# Patient Record
Sex: FEMALE | URBAN - METROPOLITAN AREA
[De-identification: names, ages, dates, MRNs, and addresses within clinical notes are randomized per-mention and may not be internally consistent; named-entity substitution may affect disease eponyms.]

---

## 2017-08-06 ENCOUNTER — EMERGENCY (EMERGENCY)
Facility: HOSPITAL | Age: 27
LOS: 1 days | Discharge: HOME | End: 2017-08-06
Admitting: EMERGENCY MEDICINE
Payer: OTHER MISCELLANEOUS

## 2017-08-06 VITALS
RESPIRATION RATE: 16 BRPM | TEMPERATURE: 98 F | DIASTOLIC BLOOD PRESSURE: 86 MMHG | HEART RATE: 78 BPM | OXYGEN SATURATION: 100 % | SYSTOLIC BLOOD PRESSURE: 130 MMHG

## 2017-08-06 PROCEDURE — 99284 EMERGENCY DEPT VISIT MOD MDM: CPT | Mod: 25

## 2017-08-06 PROCEDURE — 99053 MED SERV 10PM-8AM 24 HR FAC: CPT

## 2017-08-06 NOTE — ED PROVIDER NOTE - OBJECTIVE STATEMENT
Pt is a 26 y/o F presenting to the ED s/p needle stick to the L3 digit, onset prior to arrival. 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 Denies PEP. Finger was washed copiously. Pt denies F/C, bleeding, and LUE numbness/tingling/weakness. Pt's TDAP is UTD. Pt is a 28 y/o F presenting to the ED s/p needle stick to the L3 digit, onset prior to arrival. 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 Denies PEP. Finger was washed copiously. Pt denies F/C, bleeding, and LUE numbness/tingling/weakness. Pt's TDAP is UTD. Denies chance of pregnancy. Pt is a 28 y/o F presenting to the ED s/p needle stick to the L3 digit, onset prior to arrival. 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 Finger was washed copiously. Pt denies F/C, bleeding, and LUE numbness/tingling/weakness. Pt's TDAP is UTD. Denies chance of pregnancy.

## 2017-08-06 NOTE — ED PROVIDER NOTE - PLAN OF CARE
Rest, drink plenty of fluids.  Advance activity as tolerated.  Continue all previously prescribed medications as directed.  Follow up with [facility] in 48-72 hours. Follow up with your primary care physician in 48-72 hours- bring copies of your results.  Return to the Emergency Department for fevers,  worsening or persistent symptoms OR ANY NEW OR CONCERNING SYMPTOMS.

## 2017-08-06 NOTE — ED PROVIDER NOTE - SKIN, MLM
Skin normal color for race, warm, dry and intact. No evidence of rash. No puncture wounds, no skin break, no swelling, no erythema

## 2017-08-06 NOTE — ED PROVIDER NOTE - MEDICAL DECISION MAKING DETAILS
28 yo F s/p needle stick while on the job. Will do occupation exposure protocol. Denies PEP. Pt advised to f/u with employee health tomorrow. 28 yo F s/p needle stick while on the job. Will do occupation exposure protocol. offered PEP Denies PEP, risks and benefits discussed. Pt advised to f/u with employee health tomorrow.

## 2017-08-06 NOTE — ED PROVIDER NOTE - CARE PLAN
Principal Discharge DX:	Exposure  Instructions for follow-up, activity and diet:	Rest, drink plenty of fluids.  Advance activity as tolerated.  Continue all previously prescribed medications as directed.  Follow up with Employee health in 48-72 hours. Follow up with your primary care physician in 48-72 hours- bring copies of your results.  Return to the Emergency Department for fevers,  worsening or persistent symptoms OR ANY NEW OR CONCERNING SYMPTOMS.

## 2018-03-18 ENCOUNTER — EMERGENCY (EMERGENCY)
Facility: HOSPITAL | Age: 28
LOS: 1 days | Discharge: HOME | End: 2018-03-18
Admitting: EMERGENCY MEDICINE
Payer: COMMERCIAL

## 2018-03-18 VITALS
DIASTOLIC BLOOD PRESSURE: 80 MMHG | SYSTOLIC BLOOD PRESSURE: 125 MMHG | HEART RATE: 98 BPM | TEMPERATURE: 99 F | RESPIRATION RATE: 17 BRPM | OXYGEN SATURATION: 99 %

## 2018-03-18 LAB
ALBUMIN SERPL ELPH-MCNC: 3.7 G/DL — SIGNIFICANT CHANGE UP (ref 3.4–5)
ALP SERPL-CCNC: 50 U/L — SIGNIFICANT CHANGE UP (ref 40–120)
ALT FLD-CCNC: 14 U/L — SIGNIFICANT CHANGE UP (ref 12–42)
ANION GAP SERPL CALC-SCNC: 12 MMOL/L — SIGNIFICANT CHANGE UP (ref 9–16)
APPEARANCE UR: CLEAR — SIGNIFICANT CHANGE UP
AST SERPL-CCNC: 12 U/L — LOW (ref 15–37)
BASOPHILS NFR BLD AUTO: 0.3 % — SIGNIFICANT CHANGE UP (ref 0–2)
BILIRUB SERPL-MCNC: 0.5 MG/DL — SIGNIFICANT CHANGE UP (ref 0.2–1.2)
BILIRUB UR-MCNC: (no result)
BUN SERPL-MCNC: 10 MG/DL — SIGNIFICANT CHANGE UP (ref 7–23)
CALCIUM SERPL-MCNC: 9.2 MG/DL — SIGNIFICANT CHANGE UP (ref 8.5–10.5)
CHLORIDE SERPL-SCNC: 103 MMOL/L — SIGNIFICANT CHANGE UP (ref 96–108)
CO2 SERPL-SCNC: 25 MMOL/L — SIGNIFICANT CHANGE UP (ref 22–31)
COLOR SPEC: YELLOW — SIGNIFICANT CHANGE UP
CREAT SERPL-MCNC: 0.7 MG/DL — SIGNIFICANT CHANGE UP (ref 0.5–1.3)
DIFF PNL FLD: (no result)
EOSINOPHIL NFR BLD AUTO: 0.1 % — SIGNIFICANT CHANGE UP (ref 0–6)
GLUCOSE SERPL-MCNC: 92 MG/DL — SIGNIFICANT CHANGE UP (ref 70–99)
GLUCOSE UR QL: NEGATIVE — SIGNIFICANT CHANGE UP
HCG UR QL: NEGATIVE — SIGNIFICANT CHANGE UP
HCT VFR BLD CALC: 38.8 % — SIGNIFICANT CHANGE UP (ref 34.5–45)
HGB BLD-MCNC: 12.8 G/DL — SIGNIFICANT CHANGE UP (ref 11.5–15.5)
IMM GRANULOCYTES NFR BLD AUTO: 0.3 % — SIGNIFICANT CHANGE UP (ref 0–1.5)
KETONES UR-MCNC: 40 MG/DL
LEUKOCYTE ESTERASE UR-ACNC: NEGATIVE — SIGNIFICANT CHANGE UP
LIDOCAIN IGE QN: 79 U/L — SIGNIFICANT CHANGE UP (ref 73–393)
LYMPHOCYTES # BLD AUTO: 9.2 % — LOW (ref 13–44)
MAGNESIUM SERPL-MCNC: 1.7 MG/DL — SIGNIFICANT CHANGE UP (ref 1.6–2.6)
MCHC RBC-ENTMCNC: 30 PG — SIGNIFICANT CHANGE UP (ref 27–34)
MCHC RBC-ENTMCNC: 33 G/DL — SIGNIFICANT CHANGE UP (ref 32–36)
MCV RBC AUTO: 91.1 FL — SIGNIFICANT CHANGE UP (ref 80–100)
MONOCYTES NFR BLD AUTO: 9.1 % — SIGNIFICANT CHANGE UP (ref 2–14)
NEUTROPHILS NFR BLD AUTO: 81 % — HIGH (ref 43–77)
NITRITE UR-MCNC: NEGATIVE — SIGNIFICANT CHANGE UP
PH UR: 6 — SIGNIFICANT CHANGE UP (ref 5–8)
PLATELET # BLD AUTO: 173 K/UL — SIGNIFICANT CHANGE UP (ref 150–400)
POTASSIUM SERPL-MCNC: 3.4 MMOL/L — LOW (ref 3.5–5.3)
POTASSIUM SERPL-SCNC: 3.4 MMOL/L — LOW (ref 3.5–5.3)
PROT SERPL-MCNC: 7.5 G/DL — SIGNIFICANT CHANGE UP (ref 6.4–8.2)
PROT UR-MCNC: (no result) MG/DL
RBC # BLD: 4.26 M/UL — SIGNIFICANT CHANGE UP (ref 3.8–5.2)
RBC # FLD: 13.2 % — SIGNIFICANT CHANGE UP (ref 10.3–16.9)
SODIUM SERPL-SCNC: 140 MMOL/L — SIGNIFICANT CHANGE UP (ref 132–145)
SP GR SPEC: >=1.03 — SIGNIFICANT CHANGE UP (ref 1–1.03)
UROBILINOGEN FLD QL: 0.2 E.U./DL — SIGNIFICANT CHANGE UP
WBC # BLD: 8.6 K/UL — SIGNIFICANT CHANGE UP (ref 3.8–10.5)
WBC # FLD AUTO: 8.6 K/UL — SIGNIFICANT CHANGE UP (ref 3.8–10.5)

## 2018-03-18 PROCEDURE — 99284 EMERGENCY DEPT VISIT MOD MDM: CPT

## 2018-03-18 RX ORDER — SODIUM CHLORIDE 9 MG/ML
1000 INJECTION INTRAMUSCULAR; INTRAVENOUS; SUBCUTANEOUS ONCE
Refills: 0 | Status: COMPLETED | OUTPATIENT
Start: 2018-03-18 | End: 2018-03-18

## 2018-03-18 RX ADMIN — SODIUM CHLORIDE 1000 MILLILITER(S): 9 INJECTION INTRAMUSCULAR; INTRAVENOUS; SUBCUTANEOUS at 12:55

## 2018-03-18 NOTE — ED PROVIDER NOTE - OBJECTIVE STATEMENT
PMHx appendectomy, LMP x 3 weeks on oral contraceptives presents with generalized abdominal pain 8-10/10 in intensity waxing and waning x 3 days associated with 2 loose stools daily. patient had noted that she had been constipated earlier in the week. states that she has been having episodes of alternating constipation and loose stools for the past month. denies fever, chills, nightsweats, recent travel, dysuria, anorexia. patient states that she feels hungry but is fearful that eating may worsen her symptoms. denies any pain at this time. patient was seen at an urgent care and was told to go to the ED for further work up.

## 2018-03-18 NOTE — ED PROVIDER NOTE - MEDICAL DECISION MAKING DETAILS
Patient with episodic abdominal pain with loose stools x 3 day. asymptomatic at this time. will check labs, u/a and reassess

## 2018-03-18 NOTE — ED PROVIDER NOTE - PROGRESS NOTE DETAILS
asymptomatic while in ED. work up WNL. K 4.4, + ketones, given IV hydration. advised follow up with PCP and GI. d/c with copy of work up

## 2018-03-22 DIAGNOSIS — R10.84 GENERALIZED ABDOMINAL PAIN: ICD-10-CM

## 2018-03-22 DIAGNOSIS — Z88.0 ALLERGY STATUS TO PENICILLIN: ICD-10-CM

## 2018-03-22 DIAGNOSIS — R19.7 DIARRHEA, UNSPECIFIED: ICD-10-CM

## 2020-06-18 NOTE — ED PROVIDER NOTE - PMH
Detail Level: Detailed Detail Level: Zone <<----- Click to add NO pertinent Past Medical History No pertinent past medical history

## 2021-10-21 NOTE — ED PROVIDER NOTE - CROS ED SKIN ALL NEG
Left message to call to reschedule Carotid U/S testing for 11-9-2021 with Dr Saba Benavidez to another facility as we have closed our lab. Need to move appt to after testing.
- - -

## 2024-02-03 NOTE — ED ADULT TRIAGE NOTE - MODE OF ARRIVAL
"Elyssa Hernandez is a 56 y.o. year old  female who presents for routine GYN exam.  She is postmenopausal, not on hormones.  S/P LSH / BSO .  Denies bleeding, flashes, and sweats.  Recent mammogram was negative.  Reports loss of urine with cough / sneeze but denies urgency symptoms.  Wears a pad daily secondary to incontinence.    Blood pressure (!) 128/90, height 5' 6" (1.676 m), weight 103.5 kg (228 lb 2.8 oz).    21 Pap: Negative, HPV: Negative     23 Mammogram: Negative,  TC 16.2%    Past Medical History:   Diagnosis Date    Breast disorder     Thyroid disease        Past Surgical History:   Procedure Laterality Date    BREAST BIOPSY Left     core    HYSTERECTOMY  2006    LYMPHADENECTOMY      OOPHORECTOMY Left 2006    TEMPOROMANDIBULAR JOINT SURGERY         OB History          0    Para        Term   0            AB        Living             SAB        IAB        Ectopic        Multiple        Live Births                       ROS:  GENERAL: Feeling well overall.   SKIN: Denies rash or lesions.   HEAD: Denies head injury or headache.   NODES: Denies enlarged lymph nodes.   CHEST: Denies chest pain or shortness of breath.   CARDIOVASCULAR: Denies palpitations or left sided chest pain.   ABDOMEN: No abdominal pain, nausea, vomiting or rectal bleeding.   URINARY: Reports loss of urine with cough / sneeze.  REPRODUCTIVE: See HPI.   BREASTS: Denies pain, lumps, or nipple discharge.   HEMATOLOGIC: No easy bruisability or excessive bleeding.   MUSCULOSKELETAL: Denies joint pain.  NEUROLOGIC: Denies syncope or weakness.   PSYCHIATRIC: Denies depression.     PE:   (chaperone present during entire exam)  APPEARANCE: Well nourished, well developed, in no acute distress.  BREASTS: Symmetrical, no skin changes or visible lesions. No palpable masses, nipple discharge or adenopathy bilaterally.  ABDOMEN: Soft. No tenderness or masses. No CVA tenderness.  VULVA: No lesions. Normal female " genitalia.  URETHRAL MEATUS: Normal size and location, no lesions, no prolapse.  URETHRA: No masses, tenderness, prolapse or scarring.  VAGINA: Atrophic.  No lesions, no abnormal discharge, no significant cystocele or rectocele.  CERVIX: No lesions and discharge. PAP done.  UTERUS: Surgically absent.  BME: No masses, tenderness or CDS nodularity.  ANUS PERINEUM: Normal.    Diagnosis:  1. Women's annual routine gynecological examination    2. Postmenopausal status    3. History of hysterectomy, supracervical    4. Pap smear for cervical cancer screening    5. Screening mammogram for breast cancer          PLAN:    Orders Placed This Encounter    HPV High Risk Genotypes, PCR    Mammo Digital Screening Bilat w/ Caldreon    Liquid-Based Pap Smear, Screening       Patient was counseled today on postmenopausal issues.  We discussed her stress urinary incontinence and management options.  She plans to try timed voiding and Kegel exercises.  At this time, she does not want referral to Uro-gynecology, but may consider in the future.    Follow-up in 1 year.    This note was created with voice recognition software.  Grammatical, syntax and spelling errors may be inevitable.       Walk in

## 2025-03-07 NOTE — ED ADULT NURSE NOTE - NSSISCREENINGQ3_ED_A_ED
In an effort to ensure that our patients [employer], a Team Member has reviewed your chart and identified an opportunity to provide the best care possible. An attempt was made to discuss or schedule due or overdue Preventive or Chronic Condition care.Care Gaps identified: Cervical Cancer Screening, Immunizations, and Wellness Visits.    The Outcome was Contact was not made, letter/portal message sent.  We are attempting to schedule a follow up office visit. If you have any questions or need help with scheduling, contact our Health Outreach Team at [phone].   Type of Appointment needed: Follow-up Visit     No